# Patient Record
(demographics unavailable — no encounter records)

---

## 2025-01-23 NOTE — PROCEDURE
[FreeTextEntry1] : right temporal artery ligation and biopsy [FreeTextEntry2] : recently hospitalized for right-sided vision loss. seen by rheumatology and ophthalmology who both agree that a right temporal artery biopsy is necessary to rule out giant cell arteritis [FreeTextEntry3] : PREOPERATIVE DIAGNOSIS:  Rule out giant cell arteritis. POSTOPERATIVE DIAGNOSIS:  Rule out giant cell arteritis. PROCEDURE PERFORMED:  Right temporal artery ligation and biopsy ASSISTANT SURGEON:  None. COMPLICATIONS:  None. ESTIMATED BLOOD LOSS:  Minimal. SPECIMENS:  Right temporal artery measuring 2 cm in length. IMPLANTS:  None. ANESTHESIA:  local. FINDINGS:  The right temporal artery appeared grossly normal.  INDICATIONS FOR PROCEDURE:  The patient presented with right-sided visual changes as well as tenderness to her temporal region and headache.  She was seen by Rheumatology and was found to have an elevated ESR  and was referred to Vascular Surgery for right temporal artery biopsy.  I explained the procedure to her thoroughly.  She understood the risks, benefits, and alternatives and agreed to proceed.  DESCRIPTION OF PROCEDURE:  Informed consent was obtained.  The patient was then brought to the procedure room and placed in supine position on the table.   All pressure points were properly padded.  The patient was then prepped and draped in the usual sterile fashion.  A time-out was performed according to standard parameters.  The right temporal artery was marked out with a sterile Doppler.  Following this, local anesthesia with 2% plain was administered over the right temporal artery.  An oblique incision was then made over the right temporal region overlying the right temporal artery.  It was carried through the subcutaneous tissue using needlepoint electrocautery.  A self-retaining retractor was placed within the wound bed.  The fascia overlying the right temporal artery was sharply divided.  The right temporal artery was identified and circumferentially dissected free from surrounding tissues.  It was then ligated proximally and distally.  Its small branches were clipped.  The artery was then divided between the clips and sutures.  It was noted to be 2 cm in length.  It was passed off the field as specimen.  The wound was then carefully inspected and hemostasis was secured.  Hemostasis was also noted to be excellent at this point.  Fibular was placed in the wound bed and wound was closed in two layers using a 3-0 Vicryl and 4-0 Monocryl suture.  A sterile dressing was applied in the operating room.  All instrument and sponge counts were determined to be correct.  I was present and active throughout the entire procedure.  The patient was liberated from anesthesia and transported to the PACU in satisfactory condition.

## 2025-02-12 NOTE — HISTORY OF PRESENT ILLNESS
[FreeTextEntry1] : # Intermediate/high suspicion for GCA (13 on [place] Giant Cell Arteritis Probability Score) - admission in January 2025 - Patient presented with 3 hour of complete vision loss to her right eye on 1/10 which then improved but remained blurry;  - she followed with optho on 1/15 due to concern for GCA vs AION. work- up performed at outside ER was concerning for elevated ESR/CRP and she was transferred to [facility] for further evaluation of underlying GCA. Vision gradually improving while on steroids.  - at OSH, ESR >130 and CRP > 10; she was given total 250mg IV solumedrol. 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 - Temporal artery biopsy 1/23/2025 - Artery with mild to moderate intimal hyperplasia, Trichrome and elastic stains are performed on block 1A and show negative for temporal arteritis.   Prednisone dose has been gradually reduced, started 30 mg a day 3 days ago New C/o:  ========= disturbed sleep, frontal headache, neck pain  Vision is almost back to baseline

## 2025-02-12 NOTE — ASSESSMENT
[FreeTextEntry1] : Change in vision improved - not clear etiology likely NAION Giant cell arteritis - clinical suspicion modest and temporal biopsy and all imaging- negative, but vision improved with steroids On prednisone tapering, now on prednisone 30 mg a day PJP prophylaxis while on moderate dose prednisone - continue Bactrim DS TID until prednisone dose down to 15 mg a day Sleep disturbance and anxiety - steroids - induced  = labs = Prednisone 30 mg a day x 10 days; 20 mg a day x 10 days;15 mg a day until next visit - instructions provided to the patient = continue Bactrim DS TIW = had long discussion regarding the diagnosis and side effects of prednisone - will try to taper  follow-up in 1 month

## 2025-02-12 NOTE — REASON FOR VISIT
[Post Hospitalization] : a post hospitalization visit [Interpreters_IDNumber] : [id number] [Interpreters_FullName] : [email or web address] [TWNoteComboBox1] : [place]

## 2025-02-12 NOTE — REVIEW OF SYSTEMS
[As Noted in HPI] : as noted in HPI [Sleep Disturbances] : sleep disturbances [Anxiety] : anxiety [Negative] : Heme/Lymph [de-identified] : since on steroids

## 2025-02-12 NOTE — PHYSICAL EXAM
[General Appearance - Alert] : alert [General Appearance - In No Acute Distress] : in no acute distress [Sclera] : the sclera and conjunctiva were normal [Nasal Cavity] : the nasal mucosa and septum were normal [] : no respiratory distress [Auscultation Breath Sounds / Voice Sounds] : lungs were clear to auscultation bilaterally [Heart Rate And Rhythm] : heart rate was normal and rhythm regular [Heart Sounds] : normal S1 and S2 [Edema] : there was no peripheral edema [Abdomen Soft] : soft [Cervical Lymph Nodes Enlarged Anterior Bilaterally] : anterior cervical [Axillary Lymph Nodes Enlarged Bilaterally] : axillary [Musculoskeletal - Swelling] : no joint swelling seen [No Focal Deficits] : no focal deficits [Oriented To Time, Place, And Person] : oriented to person, place, and time [Impaired Insight] : insight and judgment were intact [FreeTextEntry1] : poor sleep

## 2025-03-29 NOTE — REVIEW OF SYSTEMS
[As Noted in HPI] : as noted in HPI [Sleep Disturbances] : sleep disturbances [Anxiety] : anxiety [Negative] : Heme/Lymph [de-identified] : since on steroids

## 2025-03-29 NOTE — CONSULT LETTER
[Dear  ___] : Dear  [unfilled], [Courtesy Letter:] : I had the pleasure of seeing your patient, [unfilled], in my office today. [Please see my note below.] : Please see my note below. [Consult Closing:] : Thank you very much for allowing me to participate in the care of this patient.  If you have any questions, please do not hesitate to contact me. [Sincerely,] : Sincerely, [FreeTextEntry2] : Anne Marie Melgar MD PCP [FreeTextEntry3] : Piedad Womack MD Director, [facility],  Rheumatology Division, Department of Medicine ,  [employer]  at [address], [address] [place] [address] Tel: [phone]

## 2025-03-29 NOTE — REVIEW OF SYSTEMS
[As Noted in HPI] : as noted in HPI [Sleep Disturbances] : sleep disturbances [Anxiety] : anxiety [Negative] : Heme/Lymph [de-identified] : since on steroids

## 2025-03-29 NOTE — CONSULT LETTER
[Dear  ___] : Dear  [unfilled], [Courtesy Letter:] : I had the pleasure of seeing your patient, [unfilled], in my office today. [Please see my note below.] : Please see my note below. [Consult Closing:] : Thank you very much for allowing me to participate in the care of this patient.  If you have any questions, please do not hesitate to contact me. [Sincerely,] : Sincerely, [FreeTextEntry2] : Anne Marie Melgar MD PCP [FreeTextEntry3] : Piedad Womack MD Director, [facility],  Rheumatology Division, Department of Medicine ,  [employer]  at [address], [address] [place] [address] Tel: [phone]

## 2025-03-29 NOTE — HISTORY OF PRESENT ILLNESS
[de-identified] : Last was seen in February, 2025 [FreeTextEntry1] : Interval History : --------------------- vision imprved although remained off prendisone for last 4 weeks no headache, no jaw claudication  feels excessively tored, but no more anxiety

## 2025-03-29 NOTE — HISTORY OF PRESENT ILLNESS
[de-identified] : Last was seen in February, 2025 [FreeTextEntry1] : Interval History : --------------------- vision imprved although remained off prendisone for last 4 weeks no headache, no jaw claudication  feels excessively tored, but no more anxiety

## 2025-03-29 NOTE — ASSESSMENT
[FreeTextEntry1] : Nonarteritis AION - Change in vision improved    no evidence of Giant cell arteritis - clinical suspicion modest and temporal biopsy and all imaging-were negative, no sms recurrence or worsening off steroids Steroidss induced spychosis  Prediabetes, steroids induced DM - will need to frecheck and fu with PCP Fatigue, possobly due to steroid taper  = labs =  no indications for steroids now, explained that A1c has to be checked in 3 months = opthalmology fu = had long discussion regarding the diagnosis and side effects of prednisone   follow-up only if needed

## 2025-06-02 NOTE — DISCUSSION/SUMMARY
[FreeTextEntry1] : 69-year-old woman who is here for initial consultation of resolved paranoia in the setting of steroid use.  Patient has a history of blurred vision and headache in February and she was diagnosed with possible temporal arteritis.  Patient is status post biopsy in February and it was negative for temporal arteritis.  Patient was placed on prednisone 60 mg with eventual decrease of 5 mg every 10 days.  During the decrease patient started experiencing paranoia and she was admitted to the hospital for further workup.  As per neurology notes EEG was unremarkable.  Patient's blurred vision is improving and she was diagnosed with nonarteritic anterior ischemic optic neuropathy.  She follows closely with Dr. Garcia.  She currently has no further paranoia or change in behavior.  Patient will follow-up with me as needed.   I spent the time noted on the day of this patient encounter preparing for, review of medical records,review of pertinent diagnostic studies, providing and documenting the above E/M service and counseling and educate patient on differential, workup, disease course, and treatment/management. Education was provided to the patient during this encounter. All questions and concerns were answered and addressed in detail. The patient verbalized understanding and agreed to plan. Patient was advised to continue to monitor for neurologic symptoms and to notify my office or go to the nearest emergency room if there are any changes. Any orders/referrals and communications were provided as well. Side effects of the above medications were discussed in detail including but not limited to applicable black box warning and teratogenicity as appropriate. Patient was advised to bring previous records to my office. A copy of the consult note will be sent to the referring physician.

## 2025-06-02 NOTE — HISTORY OF PRESENT ILLNESS
[FreeTextEntry1] : 69-year-old woman who is here for initial consultation of resolved paranoia in the setting of steroid use.  Patient has a history of blurred vision and headache in February and she was diagnosed with possible temporal arteritis.  Patient is status post biopsy in February and it was negative for temporal arteritis.  Patient was placed on prednisone 60 mg with eventual decrease of 5 mg every 10 days.  During the decrease patient started experiencing paranoia and she was admitted to the hospital for further workup.  As per neurology notes EEG was unremarkable.  Patient's blurred vision is improving and she was diagnosed with nonarteritic anterior ischemic optic neuropathy.  She follows closely with Dr. Garcia.